# Patient Record
Sex: MALE | Race: WHITE | URBAN - NONMETROPOLITAN AREA
[De-identification: names, ages, dates, MRNs, and addresses within clinical notes are randomized per-mention and may not be internally consistent; named-entity substitution may affect disease eponyms.]

---

## 2020-01-29 ENCOUNTER — OFFICE VISIT (OUTPATIENT)
Dept: PSYCHIATRY | Age: 70
End: 2020-01-29
Payer: MEDICARE

## 2020-01-29 PROCEDURE — 90792 PSYCH DIAG EVAL W/MED SRVCS: CPT | Performed by: PSYCHIATRY & NEUROLOGY

## 2020-01-29 PROCEDURE — 4004F PT TOBACCO SCREEN RCVD TLK: CPT | Performed by: PSYCHIATRY & NEUROLOGY

## 2020-01-29 RX ORDER — DULOXETIN HYDROCHLORIDE 60 MG/1
120 CAPSULE, DELAYED RELEASE ORAL DAILY
Qty: 180 CAPSULE | Refills: 1 | Status: SHIPPED | OUTPATIENT
Start: 2020-01-29 | End: 2020-07-06

## 2020-01-29 RX ORDER — LORAZEPAM 1 MG/1
1 TABLET ORAL 4 TIMES DAILY
Qty: 360 TABLET | Refills: 1 | Status: SHIPPED | OUTPATIENT
Start: 2020-01-29 | End: 2020-08-10

## 2020-01-29 RX ORDER — ARIPIPRAZOLE 5 MG/1
5 TABLET ORAL DAILY
Qty: 90 TABLET | Refills: 1 | Status: SHIPPED | OUTPATIENT
Start: 2020-01-29 | End: 2020-07-06

## 2020-01-29 RX ORDER — TRAZODONE HYDROCHLORIDE 50 MG/1
TABLET ORAL
Qty: 180 TABLET | Refills: 1 | Status: SHIPPED | OUTPATIENT
Start: 2020-01-29 | End: 2020-07-06

## 2020-01-31 RX ORDER — NITROGLYCERIN 0.4 MG/1
1 TABLET SUBLINGUAL PRN
COMMUNITY

## 2020-01-31 RX ORDER — CARVEDILOL 25 MG/1
25 TABLET ORAL 2 TIMES DAILY
COMMUNITY
Start: 2019-12-13

## 2020-01-31 RX ORDER — ISOSORBIDE MONONITRATE 30 MG/1
30 TABLET, EXTENDED RELEASE ORAL 3 TIMES DAILY
COMMUNITY
Start: 2020-01-24

## 2020-01-31 RX ORDER — CYCLOBENZAPRINE HCL 10 MG
10 TABLET ORAL 3 TIMES DAILY PRN
COMMUNITY

## 2020-01-31 RX ORDER — EVOLOCUMAB 140 MG/ML
140 INJECTION, SOLUTION SUBCUTANEOUS
COMMUNITY
Start: 2020-01-07

## 2020-01-31 RX ORDER — CLOPIDOGREL BISULFATE 75 MG/1
75 TABLET ORAL DAILY
COMMUNITY
Start: 2019-05-27

## 2020-01-31 RX ORDER — ICOSAPENT ETHYL 1000 MG/1
2 CAPSULE ORAL 2 TIMES DAILY
COMMUNITY
Start: 2019-11-15

## 2020-01-31 RX ORDER — OMEPRAZOLE 40 MG/1
40 CAPSULE, DELAYED RELEASE ORAL 2 TIMES DAILY
COMMUNITY

## 2020-01-31 RX ORDER — ISOSORBIDE MONONITRATE 60 MG/1
60 TABLET, EXTENDED RELEASE ORAL NIGHTLY
COMMUNITY

## 2020-01-31 RX ORDER — FLUTICASONE PROPIONATE 50 MCG
2 SPRAY, SUSPENSION (ML) NASAL 2 TIMES DAILY
COMMUNITY

## 2020-01-31 RX ORDER — TAMSULOSIN HYDROCHLORIDE 0.4 MG/1
0.4 CAPSULE ORAL NIGHTLY
COMMUNITY
Start: 2019-05-24

## 2020-01-31 RX ORDER — MIRABEGRON 50 MG/1
50 TABLET, FILM COATED, EXTENDED RELEASE ORAL DAILY
COMMUNITY
Start: 2019-12-13

## 2020-01-31 RX ORDER — ANASTROZOLE 1 MG/1
1 TABLET ORAL WEEKLY
COMMUNITY

## 2020-03-17 ENCOUNTER — TELEPHONE (OUTPATIENT)
Dept: PSYCHIATRY | Age: 70
End: 2020-03-17

## 2020-03-17 NOTE — TELEPHONE ENCOUNTER
Spoke with pt by phone. We agree to reschedule today's appointment due to COVID-19. He notes doing well overall. Denies depressed mood. Stressed about people hoarding store items but they have what they need at home. Anxiety is intermittent and he associates that with back and leg pain. Notes narcotic pain meds made his anxiety worse, felt jumpy. Feeling better since not using those. Feels 1 mg QID dosing of Ativan is controlling his anxiety. Dosing more evenly through the day. Denies need for med change. Advised to call with any needs.    Electronically signed by Ladonna Horne MD on 3/17/2020 at 11:39 AM

## 2020-04-08 ENCOUNTER — VIRTUAL VISIT (OUTPATIENT)
Dept: PSYCHIATRY | Age: 70
End: 2020-04-08
Payer: COMMERCIAL

## 2020-04-08 PROCEDURE — 99442 PR PHYS/QHP TELEPHONE EVALUATION 11-20 MIN: CPT | Performed by: PSYCHIATRY & NEUROLOGY

## 2020-05-19 ENCOUNTER — VIRTUAL VISIT (OUTPATIENT)
Dept: PSYCHIATRY | Age: 70
End: 2020-05-19
Payer: COMMERCIAL

## 2020-05-19 PROCEDURE — 99442 PR PHYS/QHP TELEPHONE EVALUATION 11-20 MIN: CPT | Performed by: PSYCHIATRY & NEUROLOGY

## 2020-05-19 NOTE — PROGRESS NOTES
Mikaela Valenzuela is a 68 y.o. male evaluated via telephone on 5/19/2020. Consent:  He and/or health care decision maker is aware that that he may receive a bill for this telephone service, depending on his insurance coverage, and has provided verbal consent to proceed: Yes      Documentation:  I communicated with the patient and/or health care decision maker about anxiety and depression. Details of this discussion including any medical advice provided:     Notes issues with spontaneous anxiety. \"Strange feeling, I know what's coming on. \" Cites he will use Ativan which helps in 15-20 minutes. Has been using Ativan 4 times per week in addition to his QID dosing. Spaces the scheduled doses out better than he has in the past. I advised against taking additional doses of medications. We discussed whether he is building a tolerance to Ativan which shouldn't be used long term for anxiety but he's taken it for awhile. Likely will be difficult to wean off. Had increased trazodone after last visit which did help sleep. Plans for nerve ablation and thinks at that time will be able to sleep in bed. Using recliner now. Mood is depressed. Notes his wife is \"coming around a little\" to help around the house. Note wife stubborn, won't leave the house d/t [id number]. More dreams about [address] up feeling depressed. No SI. Declines need for med change. Wants therapy referral. Describes feeling \"content\". Doesn't get \"riled like I used to\". Diagnosis Orders   1. PTSD (post-traumatic stress disorder)     2. Other chronic pain     3. Memory problem       Plan: Continue current meds. Making therapy referral/mailing self referral packet. Call with any needs. RTC in 6-8 weeks. I affirm this is a Patient Initiated Episode with a Patient who has not had a related appointment within my department in the past 7 days or scheduled within the next 24 hours.     Patient identification was verified at the start of the visit: Yes    Total Time: minutes: 11-20 minutes   Start Time: 2:45pm, End Time: 3:01pm    Note: not billable if this call serves to triage the patient into an appointment for the relevant concern    Electronically signed by Kerline Brandon MD on 5/19/2020 at 2:56 PM

## 2020-07-06 RX ORDER — DULOXETIN HYDROCHLORIDE 60 MG/1
CAPSULE, DELAYED RELEASE ORAL
Qty: 180 CAPSULE | Refills: 0 | Status: SHIPPED | OUTPATIENT
Start: 2020-07-06 | End: 2020-09-16 | Stop reason: SDUPTHER

## 2020-07-06 RX ORDER — TRAZODONE HYDROCHLORIDE 50 MG/1
TABLET ORAL
Qty: 180 TABLET | Refills: 0 | Status: SHIPPED | OUTPATIENT
Start: 2020-07-06 | End: 2020-09-16 | Stop reason: SDUPTHER

## 2020-07-06 RX ORDER — ARIPIPRAZOLE 5 MG/1
TABLET ORAL
Qty: 90 TABLET | Refills: 0 | Status: SHIPPED | OUTPATIENT
Start: 2020-07-06 | End: 2020-09-16 | Stop reason: SDUPTHER

## 2020-08-10 RX ORDER — LORAZEPAM 1 MG/1
TABLET ORAL
Qty: 360 TABLET | Refills: 1 | Status: SHIPPED | OUTPATIENT
Start: 2020-08-10 | End: 2020-11-08

## 2020-08-10 NOTE — TELEPHONE ENCOUNTER
THIS IS A DR. BRISCOE PATIENT; SHE IS OUT. [facility] mail order is requesting a medication refill for Ativan 1mg;#360 with 1 refill;last with a start date of 01/29/20 and last refill date of 04/11/20. Medication is pending your approval or a 90 day supply with 1 refill; he is scheduled to return on 09/16/20 with Dr. Brian Rodriguez.

## 2020-09-16 ENCOUNTER — VIRTUAL VISIT (OUTPATIENT)
Dept: PSYCHIATRY | Age: 70
End: 2020-09-16
Payer: COMMERCIAL

## 2020-09-16 PROCEDURE — 99442 PR PHYS/QHP TELEPHONE EVALUATION 11-20 MIN: CPT | Performed by: PSYCHIATRY & NEUROLOGY

## 2020-09-16 RX ORDER — TRAZODONE HYDROCHLORIDE 50 MG/1
TABLET ORAL
Qty: 180 TABLET | Refills: 0 | Status: SHIPPED | OUTPATIENT
Start: 2020-09-16 | End: 2021-02-19 | Stop reason: SDUPTHER

## 2020-09-16 RX ORDER — ARIPIPRAZOLE 5 MG/1
5 TABLET ORAL DAILY
Qty: 90 TABLET | Refills: 0 | Status: SHIPPED | OUTPATIENT
Start: 2020-09-16 | End: 2021-02-19 | Stop reason: SDUPTHER

## 2020-09-16 RX ORDER — DULOXETIN HYDROCHLORIDE 60 MG/1
120 CAPSULE, DELAYED RELEASE ORAL DAILY
Qty: 180 CAPSULE | Refills: 0 | Status: SHIPPED | OUTPATIENT
Start: 2020-09-16 | End: 2021-02-19 | Stop reason: SDUPTHER

## 2020-09-16 NOTE — PROGRESS NOTES
Eva Hernandez is a 65 y.o. male evaluated via telephone on 9/16/2020. Consent:  He and/or health care decision maker is aware that that he may receive a bill for this telephone service, depending on his insurance coverage, and has provided verbal consent to proceed: Yes      Documentation:  I communicated with the patient and/or health care decision maker about marital stress, medication check. Details of this discussion including any medical advice provided:     Had surgery for carpal tunnel. Healing from that. Still marital stress. \"I bite my tongue. \" Wife has trouble doing much. Spends a lot of time in her chair. He likes to keep busy. Wife deals with health issues. He tries to be understanding. Discussed ways to be more open with her about how he feels. Sees Pain Mgmt and plan for low back injections. Letting his dtr mow the grass. That was something he really enjoyed. Has lost his temper only once with wife in the last 6 mos. Wants to be more understanding. They talk a lot \"but not about the right things\". Deals with a lot of pain at night. Plans to discuss with pain mgmt. Sleep is fragmented d/t pain. Feels tired the next day. Mood is mostly stable. Ativan helps anxiety. Tends to feel the worst between 11:30am-5pm. \"That's my down time. \" Jennifer De La Rosa with his wife around that time. Diagnosis Orders   1. PTSD (post-traumatic stress disorder)     2. Other chronic pain     3. Memory problem         Plan:  No medication change   Cymbalta 120 mg QD   Abilify 5 mg QD   Trazodone 50 to 100 mg HS    Ativan 1 mg QID prn anxiety   RTC in 3 mos per his request   Call with any issues. I affirm this is a Patient Initiated Episode with a Patient who has not had a related appointment within my department in the past 7 days or scheduled within the next 24 hours.     Patient identification was verified at the start of the visit: Yes    Total Time: minutes: 11-20 minutes   Start time: 11:11am, End time:

## 2021-02-19 ENCOUNTER — VIRTUAL VISIT (OUTPATIENT)
Dept: PSYCHIATRY | Age: 71
End: 2021-02-19
Payer: MEDICARE

## 2021-02-19 DIAGNOSIS — F32.A DEPRESSION, UNSPECIFIED DEPRESSION TYPE: ICD-10-CM

## 2021-02-19 DIAGNOSIS — G89.29 OTHER CHRONIC PAIN: ICD-10-CM

## 2021-02-19 DIAGNOSIS — F43.10 PTSD (POST-TRAUMATIC STRESS DISORDER): Primary | ICD-10-CM

## 2021-02-19 DIAGNOSIS — R41.3 MEMORY PROBLEM: ICD-10-CM

## 2021-02-19 PROCEDURE — 99443 PR PHYS/QHP TELEPHONE EVALUATION 21-30 MIN: CPT | Performed by: PSYCHIATRY & NEUROLOGY

## 2021-02-19 RX ORDER — TRAZODONE HYDROCHLORIDE 50 MG/1
TABLET ORAL
Qty: 180 TABLET | Refills: 0 | Status: SHIPPED | OUTPATIENT
Start: 2021-02-19 | End: 2021-04-19 | Stop reason: SDUPTHER

## 2021-02-19 RX ORDER — DULOXETIN HYDROCHLORIDE 60 MG/1
120 CAPSULE, DELAYED RELEASE ORAL DAILY
Qty: 180 CAPSULE | Refills: 0 | Status: SHIPPED | OUTPATIENT
Start: 2021-02-19 | End: 2021-04-19 | Stop reason: SDUPTHER

## 2021-02-19 RX ORDER — ARIPIPRAZOLE 5 MG/1
5 TABLET ORAL DAILY
Qty: 90 TABLET | Refills: 0 | Status: SHIPPED | OUTPATIENT
Start: 2021-02-19 | End: 2021-04-19 | Stop reason: SDUPTHER

## 2021-02-19 RX ORDER — LORAZEPAM 1 MG/1
1 TABLET ORAL 4 TIMES DAILY PRN
Qty: 360 TABLET | Refills: 0 | Status: SHIPPED | OUTPATIENT
Start: 2021-02-19 | End: 2021-04-19 | Stop reason: SDUPTHER

## 2021-02-19 NOTE — PROGRESS NOTES
Cresencio Barboza is a 65 y.o. male evaluated via telephone on 2/19/2021. Consent:  He and/or health care decision maker is aware that that he may receive a bill for this telephone service, depending on his insurance coverage, and has provided verbal consent to proceed: Yes      Documentation:  I communicated with the patient and/or health care decision maker about marital stress, medication check. Details of this discussion including any medical advice provided:     He presents alone by phone. Notes feeling more depressed around the election and still dealing with that. Both he and his wife were feeling bad about it. Notes things with his wife are going \"OK\" and no change from previous situation. He struggles to deal with it and wishes he could say something to his wife but worries he would make her feel bad. Feels it's hard to change the situation. Considers himself selfish to want her to change. We discussed resistance and how that often creates more suffering. He may consider bringing her to a future appointment to discuss with me present. I said I'd be ok with that. He cites this is his biggest current issue, that he can't open up to her. We discussed that if he did open up to make a plan to have a comfortable setting and to ensure he's in the right mindset to discuss things in a kind, calm manner. Denies any anger outbursts. Notes it was worse years ago when he was drinking. Not much anger in the last 35 years. Notes Ativan has helped \"tremendously\". Feels he's functioning well despite not always sleeping as long as he thought he should. Might take a nap in the afternoon. Energy is ok. Using a supplement called \"forebrain\" and \"remembering a lot more than I used to\". Declines need for medication change. I'd like to discuss slowly weaning Ativan going forward. Diagnosis Orders   1. PTSD (post-traumatic stress disorder)  LORazepam (ATIVAN) 1 MG tablet   2. Other chronic pain     3.  Memory problem     4. Depression, unspecified depression type         Plan:  No medication change   Cymbalta 120 mg QD   Abilify 5 mg QD   Trazodone 50 to 100 mg HS    Ativan 1 mg QID prn anxiety   RTC in 2 mos   Call with any issues. I affirm this is a Patient Initiated Episode with a Patient who has not had a related appointment within my department in the past 7 days or scheduled within the next 24 hours.     Patient identification was verified at the start of the visit: Yes    Total Time: minutes: 21-30 minutes   Start time: 8:03am, End time: 8:24am    Note: not billable if this call serves to triage the patient into an appointment for the relevant concern      Electronically signed by Sung Baumann MD on 2/19/2021 at 8:22 AM

## 2021-04-19 ENCOUNTER — OFFICE VISIT (OUTPATIENT)
Dept: PSYCHIATRY | Age: 71
End: 2021-04-19
Payer: MEDICARE

## 2021-04-19 DIAGNOSIS — F43.10 PTSD (POST-TRAUMATIC STRESS DISORDER): Primary | ICD-10-CM

## 2021-04-19 DIAGNOSIS — F32.A DEPRESSION, UNSPECIFIED DEPRESSION TYPE: ICD-10-CM

## 2021-04-19 DIAGNOSIS — R41.3 MEMORY PROBLEM: ICD-10-CM

## 2021-04-19 DIAGNOSIS — G89.29 OTHER CHRONIC PAIN: ICD-10-CM

## 2021-04-19 PROCEDURE — 3017F COLORECTAL CA SCREEN DOC REV: CPT | Performed by: PSYCHIATRY & NEUROLOGY

## 2021-04-19 PROCEDURE — 4004F PT TOBACCO SCREEN RCVD TLK: CPT | Performed by: PSYCHIATRY & NEUROLOGY

## 2021-04-19 PROCEDURE — 1123F ACP DISCUSS/DSCN MKR DOCD: CPT | Performed by: PSYCHIATRY & NEUROLOGY

## 2021-04-19 PROCEDURE — 99214 OFFICE O/P EST MOD 30 MIN: CPT | Performed by: PSYCHIATRY & NEUROLOGY

## 2021-04-19 PROCEDURE — G8421 BMI NOT CALCULATED: HCPCS | Performed by: PSYCHIATRY & NEUROLOGY

## 2021-04-19 PROCEDURE — 4040F PNEUMOC VAC/ADMIN/RCVD: CPT | Performed by: PSYCHIATRY & NEUROLOGY

## 2021-04-19 PROCEDURE — G8427 DOCREV CUR MEDS BY ELIG CLIN: HCPCS | Performed by: PSYCHIATRY & NEUROLOGY

## 2021-04-19 RX ORDER — ARIPIPRAZOLE 5 MG/1
5 TABLET ORAL DAILY
Qty: 90 TABLET | Refills: 0 | Status: SHIPPED | OUTPATIENT
Start: 2021-04-19 | End: 2021-06-29 | Stop reason: SDUPTHER

## 2021-04-19 RX ORDER — LORAZEPAM 1 MG/1
1 TABLET ORAL 3 TIMES DAILY PRN
Qty: 270 TABLET | Refills: 0 | Status: SHIPPED | OUTPATIENT
Start: 2021-04-19 | End: 2021-07-18

## 2021-04-19 RX ORDER — TRAZODONE HYDROCHLORIDE 50 MG/1
TABLET ORAL
Qty: 180 TABLET | Refills: 0 | Status: SHIPPED | OUTPATIENT
Start: 2021-04-19 | End: 2021-06-29 | Stop reason: SDUPTHER

## 2021-04-19 RX ORDER — DULOXETIN HYDROCHLORIDE 60 MG/1
120 CAPSULE, DELAYED RELEASE ORAL DAILY
Qty: 180 CAPSULE | Refills: 0 | Status: SHIPPED | OUTPATIENT
Start: 2021-04-19 | End: 2021-06-29 | Stop reason: SDUPTHER

## 2021-04-19 NOTE — PROGRESS NOTES
[address] [facility]  [facility] [address]  [phone]    Progress Note    Patient:  Azucena Villalobos  YOB: 1950  PCP:  Lizzie Walls  Visit Date:  4/19/2021      Chief Complaint   Patient presents with    Follow-up    Medication Check    Anxiety    Depression       SUBJECTIVE:    Azucena Villalobos, a 65 y. o. male, presents for a follow up visit. Patient reports he is about the same. Patient is compliant with medication regimen. He presents alone. He was about 10 minutes late and apologized for going to the wrong building. Looking to buy a car for his grandchild today. Generally gets grandkids their first vehicle. Still marital issues at home. Wife doesn't do much. Sits in her chair x hours. She deals with pain issues. Feels he handles his pain differently. She's been \"more testy\" lately. \"Maybe that's a good thing. \"   He has upcoming back/neck surgeries which will be spaced over 2-3 year period. They are communicating more. He tells her she needs to see a doctor and she says she will when he has his back procedures done. Feels like more of an excuse. Wife sees NP in my office which does help. Memory has felt better with supplement \"Forebrain\". Still misplaces things at times. 4-5 hours sleep at night, fragmented. Will sleep 3 hours then up and down. Has taken melatonin in the past and is going to consider restarting it. Notes anxiety and sleep have improved a bit since election is over. More relaxed. Discussed his use of Ativan QID and my concerns with him being on high dose long term. He's agreeable to trying TID dosing although he describes some physiological dependence needing a dose after 7 hours without. Med Trials:Abilify, Cymbalta, Ativan, trazodone, Valium    OBJECTIVE:  Vitals: There were no vitals taken for this visit.     MENTAL STATUS EXAM:    GENERAL  Build: Overweight Hygiene:  Appropriate in casual dress   SENSORIUM Orientation: Place, Person, Time, & Situation     Consciousness: Alert    ATTENTION   Focused    RELATEDNESS  Cooperative    EYE CONTACT   Good    PSYCHOMOTOR  Normal    SPEECH Volume: Normal     Rate: Normal rate and tone    Amplitude: Within normal limits   MOOD  Euthymic    AFFECT Range: Full    THOUGHT Process:  Goal-Directed     Content: no evidence of psychosis    COGNITION Insight: Good    Judgement:  Intact    MEMORY  no gross deficits, did not test    INTELLIGENCE  Average     Sleep: Complains of not sleeping well   Nutrition: Well nourished   ADL: Satisfactory   BM: did not ask  Mobility/Gait: Independently     Controlled SubstancesMonitoring: Periodic Controlled Substance Monitoring: Possible medication side effects, risk of tolerance/dependence & alternative treatments discussed., No signs of potential drug abuse or diversion identified. Arturo Parisi MD)       ASSESSMENT: Will reduce Ativan dosing in hopes he can tolerate slow weaning. Will try OTC melatonin for sleep which is fragmented. Ongoing marital stress. I've advised he can bring his wife in for future appointments to talk about ongoing stressors. No SI. Diagnosis Orders   1. PTSD (post-traumatic stress disorder)  LORazepam (ATIVAN) 1 MG tablet   2. Other chronic pain     3. Memory problem     4. Depression, unspecified depression type     Rule Out: MCI, VERN  Medical Hx: DM2, CAD, GERD    PLAN:     · Medications:  · Cymbalta 120 mg QD  · Abilify 5 mg QD  · Trazodone 50 to 100 mg HS - uses 50 mg  · Decrease Ativan to 1 mg TID (from QID)  · Will start OTC melatonin  · Therapy: none, will offer   · Labs/Tests/Imaging: none   · Records Reviewed: [facility]  · Patient advised to call if patient has any difficulties with treatment  Return in about 8 weeks (around 6/14/2021) for med check, follow up.       Electronically signed by Arturo Parisi MD on 4/19/2021 at 2:06 PM

## 2021-05-24 ENCOUNTER — TELEPHONE (OUTPATIENT)
Dept: PSYCHIATRY | Age: 71
End: 2021-05-24

## 2021-05-24 NOTE — TELEPHONE ENCOUNTER
Jayme Voss called in seeking advice,,,,,, Jayme Voss is having a lot of neck and spinal pain,  He was advised that he needs to be off ativan before they will prescribe any pain medication,,,,, Jayme Voss was on ativan 1mg 3 times daily, he has reduced this to 1mg 2 times daily and this has been approx. 1 month, asking for additional weaning directions. Jayme Voss is scheduled for a future appointment on 7/28.

## 2021-05-24 NOTE — TELEPHONE ENCOUNTER
He can try weaning the Ativan down by 0.5 mg per week. So if taking 1 mg twice daily he can start with 0.5 mg in morning and 1 mg at night for one week. Then decrease to 0.5 mg twice daily for a week. Then 0.5 mg once daily for a week. Then stop. It is ok if he needs to wean down more slowly or more quickly. This is a general idea.      Electronically signed by Aurora Julian MD on 5/24/2021 at 12:30 PM

## 2021-06-28 ENCOUNTER — OFFICE VISIT (OUTPATIENT)
Dept: PSYCHIATRY | Age: 71
End: 2021-06-28
Payer: MEDICARE

## 2021-06-28 DIAGNOSIS — R41.3 MEMORY PROBLEM: ICD-10-CM

## 2021-06-28 DIAGNOSIS — G89.29 OTHER CHRONIC PAIN: ICD-10-CM

## 2021-06-28 DIAGNOSIS — F32.A DEPRESSION, UNSPECIFIED DEPRESSION TYPE: ICD-10-CM

## 2021-06-28 DIAGNOSIS — F43.10 PTSD (POST-TRAUMATIC STRESS DISORDER): Primary | ICD-10-CM

## 2021-06-28 PROCEDURE — 3017F COLORECTAL CA SCREEN DOC REV: CPT | Performed by: PSYCHIATRY & NEUROLOGY

## 2021-06-28 PROCEDURE — 4004F PT TOBACCO SCREEN RCVD TLK: CPT | Performed by: PSYCHIATRY & NEUROLOGY

## 2021-06-28 PROCEDURE — G8427 DOCREV CUR MEDS BY ELIG CLIN: HCPCS | Performed by: PSYCHIATRY & NEUROLOGY

## 2021-06-28 PROCEDURE — 4040F PNEUMOC VAC/ADMIN/RCVD: CPT | Performed by: PSYCHIATRY & NEUROLOGY

## 2021-06-28 PROCEDURE — 1123F ACP DISCUSS/DSCN MKR DOCD: CPT | Performed by: PSYCHIATRY & NEUROLOGY

## 2021-06-28 PROCEDURE — 99214 OFFICE O/P EST MOD 30 MIN: CPT | Performed by: PSYCHIATRY & NEUROLOGY

## 2021-06-28 PROCEDURE — G8421 BMI NOT CALCULATED: HCPCS | Performed by: PSYCHIATRY & NEUROLOGY

## 2021-06-28 NOTE — PROGRESS NOTES
8/23/2021) for med check, follow up.       Electronically signed by Aurora Julian MD on 6/29/2021 at 8:56 AM

## 2021-06-29 RX ORDER — DULOXETIN HYDROCHLORIDE 60 MG/1
120 CAPSULE, DELAYED RELEASE ORAL DAILY
Qty: 180 CAPSULE | Refills: 0 | Status: SHIPPED | OUTPATIENT
Start: 2021-06-29

## 2021-06-29 RX ORDER — TRAZODONE HYDROCHLORIDE 50 MG/1
75 TABLET ORAL NIGHTLY
Qty: 135 TABLET | Refills: 0 | Status: SHIPPED | OUTPATIENT
Start: 2021-06-29

## 2021-06-29 RX ORDER — ARIPIPRAZOLE 5 MG/1
5 TABLET ORAL DAILY
Qty: 90 TABLET | Refills: 0 | Status: SHIPPED | OUTPATIENT
Start: 2021-06-29

## 2021-08-26 ENCOUNTER — TELEPHONE (OUTPATIENT)
Dept: PSYCHIATRY | Age: 71
End: 2021-08-26

## 2021-08-26 NOTE — TELEPHONE ENCOUNTER
Rudy Montiel called into the office initially asking for a sooner appt; as of right now there is not one; he is scheduled to return on 09/20/21. He said that his depression is back up to where he does not want it at and he has noticed that he has been more forgetful recently or putting things on the \"back burner\" and either just forgetting about them or not wanting to finish them. He reports that this change has occurred over the last month. He said that none of his medications have been change, he has not had any testing for memory issues either. He is currently taking Trazodone 50mg (1.5 tabs nightly), Cymbalta 60mg (2 capsules) and Abilify 5mg (once daily); he said that he is only taking his Ativan 1mg (3 times daily now instead of 4) and he is no longer taking Melatonin at night because it makes him feel \"hung over\" the next day. Please advise. He was last seen on 06/28/21.

## 2021-09-01 NOTE — TELEPHONE ENCOUNTER
In June we increased his trazodone for sleep. Does he think that is making him too groggy the next day? Did it improve sleep? That could leave him feeling more depressed.    Electronically signed by Tod Garcia MD on 9/1/2021 at 12:09 PM

## 2021-09-02 RX ORDER — BUPROPION HYDROCHLORIDE 150 MG/1
150 TABLET ORAL EVERY MORNING
Qty: 30 TABLET | Refills: 1 | Status: SHIPPED | OUTPATIENT
Start: 2021-09-02 | End: 2021-11-29

## 2021-09-02 NOTE — TELEPHONE ENCOUNTER
It sounds like he is feeling more depressed. One option is to increase Abilify to see if that helps his mood. Another option would be to add on something like Wellbutrin which is antidepressant taken in the morning. It can improve depression, energy, motivation, and focus. He cannot take Wellbutrin if he has any history of seizures. Let me know if he would like to try either of those options or if he prefers to try and get a sooner appointment to discuss.    Thanks,  Electronically signed by Manoj Lyon MD on 9/2/2021 at 9:36 AM

## 2021-09-02 NOTE — TELEPHONE ENCOUNTER
I spoke with Navin Galindo and verified the information; he said that he is not sure that the Trazodone is the issue because about a month ago, he went back down on his Trazodone to 50mg/nightly. He reports that he is still on the same doses of the Abilify and Cymbalta medication but he has been experiencing flashbacks since the last time that he spoke to staff about a week ago as well; he said these do not happen everyday but when they do its normally in the early evening. He said that he is unsure if its because he is caring for Leyla and he is at the end of knowing what to do with her and its overwhelming. He reports that he is sleeping well so there is no issues there; he said that he wakes up around 2-3am for about 1/2 hour and then he goes back to sleep until he awakes at 6:45am-7am. He said that he just feels lazy, has no motivation and just has a \"don't give a hoot\" attitude and he says that he is not able to be that way when he has to care for Leyla. Please advise.

## 2021-09-02 NOTE — TELEPHONE ENCOUNTER
Spoke with Sammi Churchill and informed him of this information; he said that he is willing to try adding the Wellbutrin medication. He said that he [facility]. Please advise of dosing; patient was informed to check with his preferred pharmacy at the end of today or tomorrow for his new Rx; he understood.

## 2021-11-29 RX ORDER — BUPROPION HYDROCHLORIDE 150 MG/1
TABLET ORAL
Qty: 30 TABLET | Refills: 1 | Status: SHIPPED | OUTPATIENT
Start: 2021-11-29 | End: 2022-01-31

## 2021-11-29 NOTE — TELEPHONE ENCOUNTER
[facility] is requesting a medication refill on Efren's behalf for Wellbutrin XL 150mg;#30 with 1 refill;last with a start date of 09/02/21 and last refill date of 11/10/21. Medication is pending your approval for a 30 day supply with 1 refill; he is scheduled to return on 01/24/22; last seen on 06/28/21; there are a couple cancellations due to this provider being out of the office and 1 no show from the patient.

## 2022-01-31 RX ORDER — BUPROPION HYDROCHLORIDE 150 MG/1
TABLET ORAL
Qty: 30 TABLET | Refills: 3 | Status: SHIPPED | OUTPATIENT
Start: 2022-01-31 | End: 2022-06-01

## 2022-01-31 NOTE — TELEPHONE ENCOUNTER
[facility] is requesting refills on behalf of the patient.  Patient is scheduled to follow up 04/04/2022    Prescription pending approval for  Buproprion 150 mg extended release, daily, #30, 3 refills

## 2022-03-04 RX ORDER — BUPROPION HYDROCHLORIDE 150 MG/1
TABLET ORAL
Qty: 90 TABLET | Refills: 1 | OUTPATIENT
Start: 2022-03-04

## 2022-04-27 RX ORDER — BUPROPION HYDROCHLORIDE 150 MG/1
TABLET ORAL
Qty: 90 TABLET | Refills: 1 | OUTPATIENT
Start: 2022-04-27

## 2022-06-01 RX ORDER — BUPROPION HYDROCHLORIDE 150 MG/1
TABLET ORAL
Qty: 30 TABLET | Refills: 0 | Status: SHIPPED | OUTPATIENT
Start: 2022-06-01 | End: 2022-06-23

## 2022-06-01 NOTE — TELEPHONE ENCOUNTER
[facility] is requesting a medication refill on Efren's behalf for Wellbutrin XL 150mg;#30 with 3 refills; last with a start date of 01/31/22. But the pharmacy marked the last fill as a 90 day supply with 0 refills on 01/31/22. Medication is pending your approval for a 30day supply with 0 refills; he had missed his morning appt today due to connectivity issues. He has been r/sed for 06/06/22.

## 2022-06-23 RX ORDER — BUPROPION HYDROCHLORIDE 150 MG/1
TABLET ORAL
Qty: 30 TABLET | Refills: 1 | Status: SHIPPED | OUTPATIENT
Start: 2022-06-23 | End: 2022-10-12

## 2022-06-23 NOTE — TELEPHONE ENCOUNTER
[facility] is requesting a medication refill on Efren's behalf for Wellbutrin XL 150mg;#30 with 0 refills; last with a start and refill date of 06/01/22. Medication is pending your approval for a 30 day supply with 0 refills; he is scheduled to return on 08/10/22.

## 2022-07-18 RX ORDER — BUPROPION HYDROCHLORIDE 150 MG/1
TABLET ORAL
Qty: 30 TABLET | Refills: 1 | OUTPATIENT
Start: 2022-07-18

## 2022-08-10 ENCOUNTER — TELEPHONE (OUTPATIENT)
Dept: PSYCHIATRY | Age: 72
End: 2022-08-10

## 2022-08-10 NOTE — TELEPHONE ENCOUNTER
Fercho Fortune missed his appt today. He reports he did have back surgery about a month ago and has had some issues. Reports he overslept today. Attendance letter placed in provider's mailbox to sign. R/S next available in office. He reports he will need refills of medications but will call back into the office.

## 2022-10-12 RX ORDER — BUPROPION HYDROCHLORIDE 150 MG/1
TABLET ORAL
Qty: 6 TABLET | Refills: 0 | Status: SHIPPED | OUTPATIENT
Start: 2022-10-12

## 2022-10-12 NOTE — TELEPHONE ENCOUNTER
[facility] is requesting a medication refill on Efren's behalf for Wellbutrin XL 150mg;#30 with 1 refill;last with a start date of 06/23/2022 and last refill date of 09/19/2022? Medication is pending your approval for just a 6 day supply with 0 refills; to ensure he attends his upcoming appt. He is scheduled to return on 10/24/2022. Last seen on 6/28/21?

## 2022-10-24 ENCOUNTER — TELEPHONE (OUTPATIENT)
Dept: PSYCHOLOGY | Age: 72
End: 2022-10-24

## 2022-11-09 RX ORDER — BUPROPION HYDROCHLORIDE 150 MG/1
150 TABLET ORAL EVERY MORNING
Qty: 30 TABLET | Refills: 0 | Status: SHIPPED | OUTPATIENT
Start: 2022-11-09

## 2022-11-09 NOTE — TELEPHONE ENCOUNTER
LAST REFILL; PATIENT TERMED. Pool Tao called into the office leaving a voicemail stating that he needs a refill on his Wellbutrin XL 150mg medication; last rx was sent on 10/12/2022 for only 6 tablets as he was scheduled and had to attend. Patient no showed but term letter was sent within the 30 day time frame. Medication is pending your approval for a 30 day supply with 0 refills.

## 2022-11-23 RX ORDER — BUPROPION HYDROCHLORIDE 150 MG/1
TABLET ORAL
Qty: 30 TABLET | Refills: 0 | OUTPATIENT
Start: 2022-11-23

## 2023-01-03 RX ORDER — BUPROPION HYDROCHLORIDE 150 MG/1
TABLET ORAL
Qty: 30 TABLET | Refills: 0 | OUTPATIENT
Start: 2023-01-03

## 2023-02-27 RX ORDER — BUPROPION HYDROCHLORIDE 150 MG/1
TABLET ORAL
Qty: 30 TABLET | Refills: 0 | OUTPATIENT
Start: 2023-02-27

## 2023-03-24 RX ORDER — BUPROPION HYDROCHLORIDE 150 MG/1
TABLET ORAL
Qty: 30 TABLET | Refills: 0 | OUTPATIENT
Start: 2023-03-24

## 2023-05-04 RX ORDER — BUPROPION HYDROCHLORIDE 150 MG/1
TABLET ORAL
Qty: 30 TABLET | Refills: 0 | OUTPATIENT
Start: 2023-05-04

## 2023-06-07 RX ORDER — BUPROPION HYDROCHLORIDE 150 MG/1
TABLET ORAL
Qty: 30 TABLET | Refills: 0 | OUTPATIENT
Start: 2023-06-07

## 2023-10-10 RX ORDER — BUPROPION HYDROCHLORIDE 150 MG/1
150 TABLET ORAL EVERY MORNING
Qty: 30 TABLET | Refills: 0 | OUTPATIENT
Start: 2023-10-10

## 2024-10-21 ENCOUNTER — LAB (OUTPATIENT)
Age: 74
End: 2024-10-21

## 2024-10-21 LAB
ALBUMIN SERPL BCG-MCNC: 3.9 G/DL (ref 3.5–5.1)
ALP SERPL-CCNC: 82 U/L (ref 38–126)
ALT SERPL W/O P-5'-P-CCNC: 16 U/L (ref 11–66)
ANION GAP SERPL CALC-SCNC: 15 MEQ/L (ref 8–16)
AST SERPL-CCNC: 18 U/L (ref 5–40)
BASOPHILS ABSOLUTE: 0.1 THOU/MM3 (ref 0–0.1)
BASOPHILS NFR BLD AUTO: 1.2 %
BILIRUB SERPL-MCNC: 0.6 MG/DL (ref 0.3–1.2)
BUN SERPL-MCNC: 19 MG/DL (ref 7–22)
CALCIUM SERPL-MCNC: 9.3 MG/DL (ref 8.5–10.5)
CHLORIDE SERPL-SCNC: 98 MEQ/L (ref 98–111)
CHOLEST SERPL-MCNC: 194 MG/DL (ref 100–199)
CO2 SERPL-SCNC: 27 MEQ/L (ref 23–33)
CREAT SERPL-MCNC: 0.9 MG/DL (ref 0.4–1.2)
DEPRECATED RDW RBC AUTO: 51.8 FL (ref 35–45)
EOSINOPHIL NFR BLD AUTO: 1.7 %
EOSINOPHILS ABSOLUTE: 0.1 THOU/MM3 (ref 0–0.4)
ERYTHROCYTE [DISTWIDTH] IN BLOOD BY AUTOMATED COUNT: 14.6 % (ref 11.5–14.5)
GFR SERPL CREATININE-BSD FRML MDRD: 88 ML/MIN/1.73M2
GLUCOSE SERPL-MCNC: 123 MG/DL (ref 70–108)
HCT VFR BLD AUTO: 48.8 % (ref 42–52)
HDLC SERPL-MCNC: 33 MG/DL
HGB BLD-MCNC: 15.9 GM/DL (ref 14–18)
IMM GRANULOCYTES # BLD AUTO: 0.12 THOU/MM3 (ref 0–0.07)
IMM GRANULOCYTES NFR BLD AUTO: 1.4 %
LDLC SERPL CALC-MCNC: ABNORMAL MG/DL
LDLC SERPL DIRECT ASSAY-MCNC: 61.97 MG/DL
LYMPHOCYTES ABSOLUTE: 3.4 THOU/MM3 (ref 1–4.8)
LYMPHOCYTES NFR BLD AUTO: 39 %
MCH RBC QN AUTO: 31.5 PG (ref 26–33)
MCHC RBC AUTO-ENTMCNC: 32.6 GM/DL (ref 32.2–35.5)
MCV RBC AUTO: 96.8 FL (ref 80–94)
MONOCYTES ABSOLUTE: 0.6 THOU/MM3 (ref 0.4–1.3)
MONOCYTES NFR BLD AUTO: 7.5 %
NEUTROPHILS ABSOLUTE: 4.2 THOU/MM3 (ref 1.8–7.7)
NEUTROPHILS NFR BLD AUTO: 49.2 %
NRBC BLD AUTO-RTO: 0 /100 WBC
PLATELET # BLD AUTO: 133 THOU/MM3 (ref 130–400)
PMV BLD AUTO: 9.8 FL (ref 9.4–12.4)
POTASSIUM SERPL-SCNC: 4.5 MEQ/L (ref 3.5–5.2)
PROT SERPL-MCNC: 7.5 G/DL (ref 6.1–8)
RBC # BLD AUTO: 5.04 MILL/MM3 (ref 4.7–6.1)
SODIUM SERPL-SCNC: 140 MEQ/L (ref 135–145)
TRIGL SERPL-MCNC: 595 MG/DL (ref 0–199)
TSH SERPL DL<=0.005 MIU/L-ACNC: 1.73 UIU/ML (ref 0.4–4.2)
WBC # BLD AUTO: 8.6 THOU/MM3 (ref 4.8–10.8)

## 2025-04-24 ENCOUNTER — LAB (OUTPATIENT)
Age: 75
End: 2025-04-24

## 2025-04-24 LAB
ALBUMIN SERPL BCG-MCNC: 4.3 G/DL (ref 3.4–4.9)
ALP SERPL-CCNC: 84 U/L (ref 40–129)
ALT SERPL W/O P-5'-P-CCNC: 17 U/L (ref 10–50)
ANION GAP SERPL CALC-SCNC: 10 MEQ/L (ref 8–16)
AST SERPL-CCNC: 27 U/L (ref 10–50)
BASOPHILS ABSOLUTE: 0.1 THOU/MM3 (ref 0–0.1)
BASOPHILS NFR BLD AUTO: 1.1 %
BILIRUB SERPL-MCNC: 0.6 MG/DL (ref 0.3–1.2)
BUN SERPL-MCNC: 17 MG/DL (ref 8–23)
CALCIUM SERPL-MCNC: 10 MG/DL (ref 8.8–10.2)
CHLORIDE SERPL-SCNC: 102 MEQ/L (ref 98–111)
CHOLEST SERPL-MCNC: 219 MG/DL (ref 100–199)
CO2 SERPL-SCNC: 25 MEQ/L (ref 22–29)
CREAT SERPL-MCNC: 1.1 MG/DL (ref 0.7–1.2)
DEPRECATED RDW RBC AUTO: 45.1 FL (ref 35–45)
EOSINOPHIL NFR BLD AUTO: 1.5 %
EOSINOPHILS ABSOLUTE: 0.1 THOU/MM3 (ref 0–0.4)
ERYTHROCYTE [DISTWIDTH] IN BLOOD BY AUTOMATED COUNT: 13.3 % (ref 11.5–14.5)
GFR SERPL CREATININE-BSD FRML MDRD: 69 ML/MIN/1.73M2
GLUCOSE SERPL-MCNC: 118 MG/DL (ref 74–109)
HCT VFR BLD AUTO: 53.7 % (ref 42–52)
HDLC SERPL-MCNC: 27 MG/DL
HGB BLD-MCNC: 17.7 GM/DL (ref 14–18)
IMM GRANULOCYTES # BLD AUTO: 0.11 THOU/MM3 (ref 0–0.07)
IMM GRANULOCYTES NFR BLD AUTO: 1.2 %
LDLC SERPL CALC-MCNC: ABNORMAL MG/DL
LDLC SERPL DIRECT ASSAY-MCNC: 68 MG/DL
LYMPHOCYTES ABSOLUTE: 3 THOU/MM3 (ref 1–4.8)
LYMPHOCYTES NFR BLD AUTO: 33.8 %
MCH RBC QN AUTO: 30.8 PG (ref 26–33)
MCHC RBC AUTO-ENTMCNC: 33 GM/DL (ref 32.2–35.5)
MCV RBC AUTO: 93.4 FL (ref 80–94)
MONOCYTES ABSOLUTE: 0.5 THOU/MM3 (ref 0.4–1.3)
MONOCYTES NFR BLD AUTO: 5.7 %
NEUTROPHILS ABSOLUTE: 5.1 THOU/MM3 (ref 1.8–7.7)
NEUTROPHILS NFR BLD AUTO: 56.7 %
NRBC BLD AUTO-RTO: 0 /100 WBC
PLATELET # BLD AUTO: 125 THOU/MM3 (ref 130–400)
PMV BLD AUTO: 9.7 FL (ref 9.4–12.4)
POTASSIUM SERPL-SCNC: 5.3 MEQ/L (ref 3.5–5.2)
PROT SERPL-MCNC: 7.6 G/DL (ref 6.4–8.3)
PSA SERPL-MCNC: 1.91 NG/ML (ref 0–1)
RBC # BLD AUTO: 5.75 MILL/MM3 (ref 4.7–6.1)
SODIUM SERPL-SCNC: 137 MEQ/L (ref 135–145)
TRIGL SERPL-MCNC: 882 MG/DL (ref 0–199)
WBC # BLD AUTO: 9 THOU/MM3 (ref 4.8–10.8)
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
